# Patient Record
Sex: MALE | Race: WHITE
[De-identification: names, ages, dates, MRNs, and addresses within clinical notes are randomized per-mention and may not be internally consistent; named-entity substitution may affect disease eponyms.]

---

## 2017-02-02 ENCOUNTER — HOSPITAL ENCOUNTER (OUTPATIENT)
Dept: HOSPITAL 80 - FIMAGING | Age: 67
End: 2017-02-02
Attending: INTERNAL MEDICINE
Payer: COMMERCIAL

## 2017-02-02 ENCOUNTER — HOSPITAL ENCOUNTER (OUTPATIENT)
Dept: HOSPITAL 80 - BMCIMAGING | Age: 67
End: 2017-02-02
Attending: INTERNAL MEDICINE
Payer: COMMERCIAL

## 2017-02-02 DIAGNOSIS — I51.7: ICD-10-CM

## 2017-02-02 DIAGNOSIS — J44.9: ICD-10-CM

## 2017-02-02 DIAGNOSIS — J44.9: Primary | ICD-10-CM

## 2017-02-02 DIAGNOSIS — R05: ICD-10-CM

## 2017-02-02 DIAGNOSIS — R91.8: Primary | ICD-10-CM

## 2017-02-02 DIAGNOSIS — I25.10: ICD-10-CM

## 2017-02-02 DIAGNOSIS — R89.9: ICD-10-CM

## 2017-02-02 DIAGNOSIS — J40: ICD-10-CM

## 2017-02-02 DIAGNOSIS — R91.8: ICD-10-CM

## 2017-02-02 LAB
CREAT SERPL-MCNC: 1.1 MG/DL (ref 0.7–1.3)
GFR SERPL CREATININE-BSD FRML MDRD: > 60 ML/MIN/{1.73_M2}

## 2017-02-02 PROCEDURE — 71260 CT THORAX DX C+: CPT

## 2017-02-02 PROCEDURE — 71020: CPT

## 2017-02-02 NOTE — CT
CT Chest With Contrast



History: Prominent right hilum on chest radiograph. Nonsmoker with history of testicular cancer.



Comparison: PA and lateral chest February 2, 2017.



Technique: Axial enhanced images were obtained through the chest following the uneventful intravenous
 administration of 90 mL Isovue-300. Coronal MIPs were performed. Creatinine is 1.1. Dose reduction t
echniques were utilized.



Findings: Mild apical scarring is present. There is diffuse peribronchial thickening with scattered m
ucous plugging, most prominent in the superior segment left lower lobe. There is minimal bronchiectas
is. Mild interlobular septal thickening in the anterior right middle lobe and lingula is noted. Tiny 
groundglass opacities in the posterior right middle lobe likely inflammatory or infectious. Heart siz
e is normal. Mild coronary artery atherosclerosis is present. The aorta is normal in caliber without 
dissection. Mildly prominent right hilar and mediastinal lymph nodes including a 2.0 x 1.4 cm subcari
nal node (series 3, image 106) and a reference 1.5 x 1.5 cm right hilar node (image 125), are noted. 
Degenerative change is present in the spine with no aggressive osseous lesions. Moderate stool is pre
sent in the visible colon. Cholelithiasis is suspected. Retroperitoneal surgical clips are noted.



Impression: 

1. Nonspecific mildly prominent mediastinal and right hilar lymph nodes, which may be reactive. Short
-term follow up CT is recommended in 3 months.

2. Bronchitis with scattered mucous plugging.

3. Additional findings as above.



Findings discussed with Dr. Emile Mayfield on February 2, 2017 at 1631 hours.

## 2017-05-04 ENCOUNTER — HOSPITAL ENCOUNTER (OUTPATIENT)
Dept: HOSPITAL 80 - BHFA | Age: 67
End: 2017-05-04
Attending: INTERNAL MEDICINE
Payer: COMMERCIAL

## 2017-05-04 DIAGNOSIS — J45.909: Primary | ICD-10-CM

## 2017-05-04 PROCEDURE — 94060 EVALUATION OF WHEEZING: CPT

## 2017-05-04 PROCEDURE — 94726 PLETHYSMOGRAPHY LUNG VOLUMES: CPT

## 2017-05-04 PROCEDURE — 94070 EVALUATION OF WHEEZING: CPT

## 2017-05-04 PROCEDURE — 94729 DIFFUSING CAPACITY: CPT

## 2019-04-04 ENCOUNTER — HOSPITAL ENCOUNTER (OUTPATIENT)
Dept: HOSPITAL 80 - BMCIMAGING | Age: 69
End: 2019-04-04
Attending: INTERNAL MEDICINE
Payer: COMMERCIAL

## 2019-04-04 DIAGNOSIS — J18.9: Primary | ICD-10-CM

## 2019-04-22 ENCOUNTER — HOSPITAL ENCOUNTER (OUTPATIENT)
Dept: HOSPITAL 80 - BMCIMAGING | Age: 69
End: 2019-04-22
Attending: INTERNAL MEDICINE
Payer: COMMERCIAL

## 2019-04-22 DIAGNOSIS — Z09: Primary | ICD-10-CM

## 2019-04-22 DIAGNOSIS — Z87.01: ICD-10-CM
